# Patient Record
Sex: MALE | Race: ASIAN | NOT HISPANIC OR LATINO | Employment: OTHER | URBAN - METROPOLITAN AREA
[De-identification: names, ages, dates, MRNs, and addresses within clinical notes are randomized per-mention and may not be internally consistent; named-entity substitution may affect disease eponyms.]

---

## 2022-03-26 ENCOUNTER — HOSPITAL ENCOUNTER (OUTPATIENT)
Dept: RADIOLOGY | Facility: MEDICAL CENTER | Age: 77
End: 2022-03-26
Payer: MEDICARE

## 2022-03-26 ENCOUNTER — HOSPITAL ENCOUNTER (INPATIENT)
Facility: MEDICAL CENTER | Age: 77
LOS: 2 days | DRG: 282 | End: 2022-03-28
Attending: INTERNAL MEDICINE | Admitting: STUDENT IN AN ORGANIZED HEALTH CARE EDUCATION/TRAINING PROGRAM
Payer: MEDICARE

## 2022-03-26 DIAGNOSIS — J43.1 PANLOBULAR EMPHYSEMA (HCC): ICD-10-CM

## 2022-03-26 DIAGNOSIS — I48.91 ATRIAL FIBRILLATION WITH SLOW VENTRICULAR RESPONSE (HCC): ICD-10-CM

## 2022-03-26 PROBLEM — I21.4 NSTEMI (NON-ST ELEVATED MYOCARDIAL INFARCTION) (HCC): Status: ACTIVE | Noted: 2022-03-26

## 2022-03-26 PROCEDURE — 700111 HCHG RX REV CODE 636 W/ 250 OVERRIDE (IP): Performed by: STUDENT IN AN ORGANIZED HEALTH CARE EDUCATION/TRAINING PROGRAM

## 2022-03-26 PROCEDURE — 770020 HCHG ROOM/CARE - TELE (206)

## 2022-03-26 PROCEDURE — 99223 1ST HOSP IP/OBS HIGH 75: CPT | Mod: AI | Performed by: STUDENT IN AN ORGANIZED HEALTH CARE EDUCATION/TRAINING PROGRAM

## 2022-03-26 PROCEDURE — 93005 ELECTROCARDIOGRAM TRACING: CPT | Performed by: STUDENT IN AN ORGANIZED HEALTH CARE EDUCATION/TRAINING PROGRAM

## 2022-03-26 RX ORDER — METOPROLOL SUCCINATE 25 MG/1
25 TABLET, EXTENDED RELEASE ORAL DAILY
Status: DISCONTINUED | OUTPATIENT
Start: 2022-03-27 | End: 2022-03-26

## 2022-03-26 RX ORDER — AMLODIPINE BESYLATE 10 MG/1
10 TABLET ORAL DAILY
Status: DISCONTINUED | OUTPATIENT
Start: 2022-03-27 | End: 2022-03-28 | Stop reason: HOSPADM

## 2022-03-26 RX ORDER — LEVOTHYROXINE SODIUM 0.07 MG/1
75 TABLET ORAL
Status: DISCONTINUED | OUTPATIENT
Start: 2022-03-27 | End: 2022-03-28 | Stop reason: HOSPADM

## 2022-03-26 RX ORDER — CLOPIDOGREL BISULFATE 75 MG/1
75 TABLET ORAL DAILY
Status: DISCONTINUED | OUTPATIENT
Start: 2022-03-27 | End: 2022-03-27

## 2022-03-26 RX ORDER — LISINOPRIL 10 MG/1
10 TABLET ORAL DAILY
Status: DISCONTINUED | OUTPATIENT
Start: 2022-03-27 | End: 2022-03-28 | Stop reason: HOSPADM

## 2022-03-26 RX ORDER — ATORVASTATIN CALCIUM 40 MG/1
40 TABLET, FILM COATED ORAL DAILY
Status: DISCONTINUED | OUTPATIENT
Start: 2022-03-27 | End: 2022-03-28 | Stop reason: HOSPADM

## 2022-03-26 RX ORDER — HEPARIN SODIUM 5000 [USP'U]/ML
5000 INJECTION, SOLUTION INTRAVENOUS; SUBCUTANEOUS EVERY 8 HOURS
Status: DISCONTINUED | OUTPATIENT
Start: 2022-03-26 | End: 2022-03-27

## 2022-03-26 RX ADMIN — HEPARIN SODIUM 5000 UNITS: 5000 INJECTION, SOLUTION INTRAVENOUS; SUBCUTANEOUS at 22:43

## 2022-03-26 ASSESSMENT — COGNITIVE AND FUNCTIONAL STATUS - GENERAL
DAILY ACTIVITIY SCORE: 24
SUGGESTED CMS G CODE MODIFIER DAILY ACTIVITY: CH
MOBILITY SCORE: 24
SUGGESTED CMS G CODE MODIFIER MOBILITY: CH

## 2022-03-26 ASSESSMENT — LIFESTYLE VARIABLES
HOW MANY TIMES IN THE PAST YEAR HAVE YOU HAD 5 OR MORE DRINKS IN A DAY: 0
ON A TYPICAL DAY WHEN YOU DRINK ALCOHOL HOW MANY DRINKS DO YOU HAVE: 0
CONSUMPTION TOTAL: NEGATIVE
HAVE YOU EVER FELT YOU SHOULD CUT DOWN ON YOUR DRINKING: NO
DOES PATIENT WANT TO STOP DRINKING: NO
TOTAL SCORE: 0
EVER HAD A DRINK FIRST THING IN THE MORNING TO STEADY YOUR NERVES TO GET RID OF A HANGOVER: NO
EVER FELT BAD OR GUILTY ABOUT YOUR DRINKING: NO
AVERAGE NUMBER OF DAYS PER WEEK YOU HAVE A DRINK CONTAINING ALCOHOL: 0
TOTAL SCORE: 0
HAVE PEOPLE ANNOYED YOU BY CRITICIZING YOUR DRINKING: NO
ALCOHOL_USE: NO
TOTAL SCORE: 0

## 2022-03-26 ASSESSMENT — ENCOUNTER SYMPTOMS
EYES NEGATIVE: 1
GASTROINTESTINAL NEGATIVE: 1
PSYCHIATRIC NEGATIVE: 1
NEUROLOGICAL NEGATIVE: 1
CARDIOVASCULAR NEGATIVE: 1
MUSCULOSKELETAL NEGATIVE: 1
RESPIRATORY NEGATIVE: 1

## 2022-03-26 ASSESSMENT — PATIENT HEALTH QUESTIONNAIRE - PHQ9
SUM OF ALL RESPONSES TO PHQ9 QUESTIONS 1 AND 2: 0
2. FEELING DOWN, DEPRESSED, IRRITABLE, OR HOPELESS: NOT AT ALL
1. LITTLE INTEREST OR PLEASURE IN DOING THINGS: NOT AT ALL

## 2022-03-26 ASSESSMENT — FIBROSIS 4 INDEX
FIB4 SCORE: 2.61
FIB4 SCORE: 2.61

## 2022-03-27 ENCOUNTER — APPOINTMENT (OUTPATIENT)
Dept: CARDIOLOGY | Facility: MEDICAL CENTER | Age: 77
DRG: 282 | End: 2022-03-27
Attending: INTERNAL MEDICINE
Payer: MEDICARE

## 2022-03-27 LAB
ALBUMIN SERPL BCP-MCNC: 4 G/DL (ref 3.2–4.9)
ALBUMIN/GLOB SERPL: 1.6 G/DL
ALP SERPL-CCNC: 66 U/L (ref 30–99)
ALT SERPL-CCNC: 13 U/L (ref 2–50)
ANION GAP SERPL CALC-SCNC: 10 MMOL/L (ref 7–16)
AST SERPL-CCNC: 19 U/L (ref 12–45)
BILIRUB SERPL-MCNC: 0.5 MG/DL (ref 0.1–1.5)
BUN SERPL-MCNC: 23 MG/DL (ref 8–22)
CALCIUM SERPL-MCNC: 9.1 MG/DL (ref 8.5–10.5)
CHLORIDE SERPL-SCNC: 99 MMOL/L (ref 96–112)
CO2 SERPL-SCNC: 26 MMOL/L (ref 20–33)
CREAT SERPL-MCNC: 1.65 MG/DL (ref 0.5–1.4)
EKG IMPRESSION: NORMAL
ERYTHROCYTE [DISTWIDTH] IN BLOOD BY AUTOMATED COUNT: 49.6 FL (ref 35.9–50)
GFR SERPLBLD CREATININE-BSD FMLA CKD-EPI: 42 ML/MIN/1.73 M 2
GLOBULIN SER CALC-MCNC: 2.5 G/DL (ref 1.9–3.5)
GLUCOSE SERPL-MCNC: 138 MG/DL (ref 65–99)
HCT VFR BLD AUTO: 42 % (ref 42–52)
HGB BLD-MCNC: 14.2 G/DL (ref 14–18)
LV EJECT FRACT  99904: 65
LV EJECT FRACT MOD 2C 99903: 72.97
LV EJECT FRACT MOD 4C 99902: 65.57
LV EJECT FRACT MOD BP 99901: 69.31
MAGNESIUM SERPL-MCNC: 2.2 MG/DL (ref 1.5–2.5)
MCH RBC QN AUTO: 33.4 PG (ref 27–33)
MCHC RBC AUTO-ENTMCNC: 33.8 G/DL (ref 33.7–35.3)
MCV RBC AUTO: 98.8 FL (ref 81.4–97.8)
PLATELET # BLD AUTO: 238 K/UL (ref 164–446)
PMV BLD AUTO: 10.4 FL (ref 9–12.9)
POTASSIUM SERPL-SCNC: 4.4 MMOL/L (ref 3.6–5.5)
PROT SERPL-MCNC: 6.5 G/DL (ref 6–8.2)
RBC # BLD AUTO: 4.25 M/UL (ref 4.7–6.1)
SODIUM SERPL-SCNC: 135 MMOL/L (ref 135–145)
TROPONIN T SERPL-MCNC: 34 NG/L (ref 6–19)
TSH SERPL DL<=0.005 MIU/L-ACNC: 2.07 UIU/ML (ref 0.38–5.33)
WBC # BLD AUTO: 8.6 K/UL (ref 4.8–10.8)

## 2022-03-27 PROCEDURE — 770020 HCHG ROOM/CARE - TELE (206)

## 2022-03-27 PROCEDURE — 93306 TTE W/DOPPLER COMPLETE: CPT

## 2022-03-27 PROCEDURE — 93010 ELECTROCARDIOGRAM REPORT: CPT | Performed by: INTERNAL MEDICINE

## 2022-03-27 PROCEDURE — 99221 1ST HOSP IP/OBS SF/LOW 40: CPT | Performed by: INTERNAL MEDICINE

## 2022-03-27 PROCEDURE — 84443 ASSAY THYROID STIM HORMONE: CPT

## 2022-03-27 PROCEDURE — 700102 HCHG RX REV CODE 250 W/ 637 OVERRIDE(OP): Performed by: STUDENT IN AN ORGANIZED HEALTH CARE EDUCATION/TRAINING PROGRAM

## 2022-03-27 PROCEDURE — 85027 COMPLETE CBC AUTOMATED: CPT

## 2022-03-27 PROCEDURE — 84484 ASSAY OF TROPONIN QUANT: CPT

## 2022-03-27 PROCEDURE — 700111 HCHG RX REV CODE 636 W/ 250 OVERRIDE (IP): Performed by: STUDENT IN AN ORGANIZED HEALTH CARE EDUCATION/TRAINING PROGRAM

## 2022-03-27 PROCEDURE — A9270 NON-COVERED ITEM OR SERVICE: HCPCS | Performed by: STUDENT IN AN ORGANIZED HEALTH CARE EDUCATION/TRAINING PROGRAM

## 2022-03-27 PROCEDURE — 36415 COLL VENOUS BLD VENIPUNCTURE: CPT

## 2022-03-27 PROCEDURE — 700102 HCHG RX REV CODE 250 W/ 637 OVERRIDE(OP): Performed by: INTERNAL MEDICINE

## 2022-03-27 PROCEDURE — A9270 NON-COVERED ITEM OR SERVICE: HCPCS | Performed by: INTERNAL MEDICINE

## 2022-03-27 PROCEDURE — 80053 COMPREHEN METABOLIC PANEL: CPT

## 2022-03-27 PROCEDURE — 83735 ASSAY OF MAGNESIUM: CPT

## 2022-03-27 RX ADMIN — LISINOPRIL 10 MG: 10 TABLET ORAL at 05:20

## 2022-03-27 RX ADMIN — CLOPIDOGREL BISULFATE 75 MG: 75 TABLET ORAL at 05:20

## 2022-03-27 RX ADMIN — HEPARIN SODIUM 5000 UNITS: 5000 INJECTION, SOLUTION INTRAVENOUS; SUBCUTANEOUS at 05:20

## 2022-03-27 RX ADMIN — APIXABAN 2.5 MG: 2.5 TABLET, FILM COATED ORAL at 10:05

## 2022-03-27 RX ADMIN — ATORVASTATIN CALCIUM 40 MG: 40 TABLET, FILM COATED ORAL at 05:20

## 2022-03-27 RX ADMIN — AMLODIPINE BESYLATE 10 MG: 10 TABLET ORAL at 05:20

## 2022-03-27 RX ADMIN — APIXABAN 2.5 MG: 2.5 TABLET, FILM COATED ORAL at 16:14

## 2022-03-27 RX ADMIN — LEVOTHYROXINE SODIUM 75 MCG: 0.07 TABLET ORAL at 05:20

## 2022-03-27 ASSESSMENT — ENCOUNTER SYMPTOMS
PALPITATIONS: 0
BRUISES/BLEEDS EASILY: 0
MYALGIAS: 0
NAUSEA: 0
WHEEZING: 0
NERVOUS/ANXIOUS: 0
EYE DISCHARGE: 0
FATIGUE: 1
FEVER: 0
VOMITING: 0
COUGH: 0
EYE PAIN: 0
ABDOMINAL PAIN: 0
CHILLS: 0

## 2022-03-27 ASSESSMENT — PAIN DESCRIPTION - PAIN TYPE: TYPE: ACUTE PAIN

## 2022-03-27 ASSESSMENT — FIBROSIS 4 INDEX: FIB4 SCORE: 1.73

## 2022-03-27 NOTE — FACE TO FACE
Face to Face Note  -  Durable Medical Equipment    Chaparro De Los Santos M.D. - NPI: [id number]  I certify that this patient is under my care and that they had a durable medical equipment(DME)face to face encounter by myself that meets the physician DME face-to-face encounter requirements with this patient on:    Date of encounter:   Patient:                    MRN:                       YOB: 2024  Donald Lopez  [id number]  1945     The encounter with the patient was in whole, or in part, for the following medical condition, which is the primary reason for durable medical equipment:  COPD    I certify that, based on my findings, the following durable medical equipment is medically necessary:  Oxygen.    HOME O2 Saturation Measurements:(Values must be present for Home Oxygen orders)  Room air sat at rest: 90  Room air sat with amb: 84  With liters of O2: 2, O2 sat at rest with O2: 93  With Liters of O2: 4, O2 sat with amb with O2 : 94  Is the patient mobile?: Yes    My Clinical findings support the need for the above equipment due to:  Hypoxia    Supporting Symptoms: hypoxia    If patient feels more short of breath, they can go up to 6 liters per minute and contact healthcare provider.

## 2022-03-27 NOTE — DISCHARGE PLANNING
Received Choice form at 1122  Agency/Facility Name: [facility]   Referral sent per Choice form @ 1128

## 2022-03-27 NOTE — DISCHARGE PLANNING
Agency/Facility Name: [facility]   Spoke To: Neal   Outcome: DPA received all regarding patients DME Order. Requested if [facility] needs to service patient for drive home before creating new set up for patients home.   DPA requested information from RN CM via Voalte  And VM left for follow up/    RN CM notified     Agency/Facility Name: [facility]  Spoke To: Neal   Outcome: DPA received a call from DME Coordinator confirming patients DME Order and concern of getting patients concentrator and tanks from [facility].  DPA confirmed with RN CM  That voicemail was  not received with the understanding that [employer] extension for  RN CM was not correct.   DPA forwarded  call down to RN CM to confirm tank quantity and timing of DME delivery.    RN CM notified

## 2022-03-27 NOTE — ASSESSMENT & PLAN NOTE
Noted to have slow ventricular response with bradycardia as low as 40s at outside facility  Currently heart rate in normal range and in sinus rhythm, confirmed by EKG  Telemetry monitoring  Cardiology consulted, follow official note

## 2022-03-27 NOTE — DISCHARGE PLANNING
Anticipated Discharge Disposition: Home with home oxygen    Action: contacted the patient over the phone to discuss DME company choices. The patient is not aware of any local companies and is ok to sending referrals to national companies.    Choice form faxed to [facility] to [phone].    Barriers to Discharge: Oxygen delivery    Plan: Will need to follow up on oxygen

## 2022-03-27 NOTE — PROGRESS NOTES
Patient stated that their home oxygen was delivered to their home in [place]. No O2 tanks have yet been delivered bedside.  notified.

## 2022-03-27 NOTE — ASSESSMENT & PLAN NOTE
Admit patient to telemetry  Troponin elevated, loaded with ASA at outside facility, trend troponin   Continue aspirin Plavix  TTE  Cardiology consulted, follow official note

## 2022-03-27 NOTE — CARE PLAN
The patient is Stable - Low risk of patient condition declining or worsening    Shift Goals  Clinical Goals: monitor HR and BP, cards consult in AM  Patient Goals: sleep, food  Family Goals: comfort    Progress made toward(s) clinical / shift goals:  Yes    Problem: Knowledge Deficit - Standard  Goal: Patient and family/care givers will demonstrate understanding of plan of care, disease process/condition, diagnostic tests and medications  Outcome: Progressing       Patient is not progressing towards the following goals:

## 2022-03-27 NOTE — DISCHARGE SUMMARY
Discharge Summary    CHIEF COMPLAINT ON ADMISSION  No chief complaint on file.      Reason for Admission  Bradycardia     Admission Date  3/26/2022    CODE STATUS  Full Code    HPI & HOSPITAL COURSE  Donald Lopez is a 76 y.o. male who presented 3/26/2022 with Generalized weakness and malaise for several days.  Patient states that his oxygen has been below 90% on room air as he has been checking it at home.  Does not use home oxygen.     Patient has a history of atrial fibrillation, hypothyroidism, hypertension, extensive smoking history.  He smoked about 50-pack-years, however has not smoked in the last 7 years.  Has never been evaluated for COPD.  Patient has never had an MI before.  States that he had a stress test last month, and was told it was negative.     He presented to outside facility and was found to have atrial fibrillation with slow ventricular response.  He was found to have 3 troponins are slightly elevated.  Throughout ER course, patient was found to have bradycardia as low as 46.  Cardiology was consulted and agreed for transfer for possible NSTEMI and possible pacemaker placement    Cardiology saw him this morning and think that he is having paroxysmal atrial fibrillation with some component of probable sick sinus syndrome.  And they do not recommend pacemaker at this point but recommended that he may require in the future and to follow-up with cardiology from [place].  In addition to recommend to discontinue Plavix and started patient on Eliquis.  We will perform home oxygen evaluation for him.  In the meantime due to his heart rate in his 50-60 range holding metoprolol at this point.  He was instructed to come back to ER if his symptoms get worse.  I saw and examined the patient today.  Please call [phone] to schedule PCP appointment for patient.    Required specialty appointments include:     As below  Therefore, he is discharged in fair and stable condition to home with close outpatient  follow-up.    The patient met 2-midnight criteria for an inpatient stay at the time of discharge.    Discharge Date  03/27/22  \    FOLLOW UP ITEMS POST DISCHARGE      DISCHARGE DIAGNOSES  Principal Problem:    NSTEMI (non-ST elevated myocardial infarction) (HCC) POA: Yes  Active Problems:    Hypothyroid POA: Unknown    CAD (coronary artery disease) POA: Unknown    Hypertension POA: Unknown    Panlobular emphysema (HCC) POA: Unknown    Atrial fibrillation with slow ventricular response (HCC) POA: Unknown  Resolved Problems:    * No resolved hospital problems. *      FOLLOW UP  No future appointments.  PCP     In 1 week      cardiology at [facility]    In 1 week        MEDICATIONS ON DISCHARGE     Medication List      START taking these medications      Instructions   apixaban 5mg Tabs  Commonly known as: ELIQUIS   Take 1 Tablet by mouth 2 times a day. Indications: Thromboembolism secondary to Atrial Fibrillation  Dose: 5 mg        CONTINUE taking these medications      Instructions   amLODIPine 5 MG Tabs  Commonly known as: NORVASC   Take 10 mg by mouth every day.  Dose: 10 mg     atorvastatin 20 MG Tabs  Commonly known as: LIPITOR   Take 40 mg by mouth every day.  Dose: 40 mg     famotidine 20 MG Tabs  Commonly known as: PEPCID   Take 20 mg by mouth 2 times a day.  Dose: 20 mg     levothyroxine 75 MCG Tabs  Commonly known as: SYNTHROID   Take 75 mcg by mouth.  Dose: 75 mcg     lisinopril 10 MG Tabs  Commonly known as: PRINIVIL   Take 10 mg by mouth every day.  Dose: 10 mg        STOP taking these medications    clopidogrel 75 MG Tabs  Commonly known as: PLAVIX     metoprolol SR 50 MG Tb24  Commonly known as: TOPROL XL            Allergies  No Known Allergies    DIET  Orders Placed This Encounter   Procedures   • Diet Order Diet: Cardiac     Standing Status:   Standing     Number of Occurrences:   1     Order Specific Question:   Diet:     Answer:   Cardiac [6]       ACTIVITY  As tolerated.  Weight bearing as  tolerated    CONSULTATIONS  card    PROCEDURES      LABORATORY  Lab Results   Component Value Date    SODIUM 132 (L) 03/26/2022    POTASSIUM 4.3 03/26/2022    CHLORIDE 99 03/26/2022    CO2 27 03/26/2022    GLUCOSE 122 (H) 03/26/2022    BUN 26 (H) 03/26/2022    CREATININE 1.5 (H) 03/26/2022        Lab Results   Component Value Date    WBC 12.6 (H) 03/26/2022    HEMOGLOBIN 14.6 03/26/2022    HEMATOCRIT 43.8 03/26/2022    PLATELETCT 274 03/26/2022        Total time of the discharge process exceeds 38 minutes.

## 2022-03-27 NOTE — PROGRESS NOTES
4 Eyes Skin Assessment Completed by REGINADL Enriquez and Ashley RN.    Head WDL  Ears WDL  Nose WDL  Mouth WDL  Neck WDL  Breast/Chest WDL  Shoulder Blades WDL  Spine WDL  (R) Arm/Elbow/Hand WDL  (L) Arm/Elbow/Hand WDL  Abdomen WDL  Groin Scar  Scrotum/Coccyx/Buttocks WDL  (R) Leg Scar and Scab  (L) Leg Scar and Scab  (R) Heel/Foot/Toe WDL  (L) Heel/Foot/Toe WDL          Devices In Places Tele Box, Pulse Ox and Nasal Cannula      Interventions In Place NC W/Ear Foams    Possible Skin Injury No    Pictures Uploaded Into Epic N/A  Wound Consult Placed N/A  RN Wound Prevention Protocol Ordered No

## 2022-03-27 NOTE — RESPIRATORY CARE
COPD EDUCATION by COPD CLINICAL EDUCATOR  3/27/2022 at 6:05 AM by Yesy Valladares, RRT     Patient reviewed by COPD education team. Patient does not have a history or diagnosis of COPD and is a non-smoker.  Therefore, patient does not qualify for the COPD program.

## 2022-03-27 NOTE — PROGRESS NOTES
Patient arrived via direct transfer EMS, report received. Patient placed on tele monitor, monitor room notified. Pt VSS, family present. EKG performed, SR. Physical assessment performed. Patient is A&O X 4, pain level 0.  Patient oriented to room and unit routine. Patient educated on plan for the day. Personal belongings within reach and educated on use of call light.

## 2022-03-27 NOTE — PROGRESS NOTES
Assumed care of patient, received bedside report from NOC RN. Patient is A&O X 4. Pain 0/10. Vital signs stable overnight, on 2L NC satting 94%. On tele monitor, SR 62. POC discussed with patient and wife and they verbalized understanding. Call light within reach and fall precautions in place. Bed locked and in lowest position.

## 2022-03-27 NOTE — CONSULTS
Cardiology Initial Consultation    Date of Service  3/27/2022    Referring Physician  Avery Moura M.D.    Reason for Consultation  Paroxysmal atrial fibrillation    History of Presenting Illness  Donald Lopez is a 76 y.o. male with a past medical history of known paroxysmal atrial fibrillation who presented 3/26/2022 with atrial fibrillation at [facility].  In A. fib with slow ventricular response/felt weak no syncope.  [place] in [facility].  Patient went back into sinus rhythm last night and felt back to baseline.  History of some component of aortic stenosis, peripheral vascular disease status post iliac stents and femoral endarterectomies.  History of MGUS.  Followed at [facility].  Recent diagnosis of atrial fibrillation approximately a week ago.  Was placed on Eliquis but has not started.  Has been on Plavix.  Patient went back in a sinus rhythm and feels back to baseline.  Denies smoking at this time previously smoked up to 7 years ago.  No alcohol use.  .  Has children.  Retired businessman.  Lives in [place].  CT scan without pulmonary embolus.  Minimal troponin rise.  No chest pain.    Review of Systems  Review of Systems   Constitutional: Positive for fatigue. Negative for chills and fever.   HENT: Negative for congestion.    Eyes: Negative for pain and discharge.   Respiratory: Negative for cough and wheezing.    Cardiovascular: Negative for chest pain and palpitations.   Gastrointestinal: Negative for abdominal pain, nausea and vomiting.   Musculoskeletal: Negative for myalgias.   Skin: Negative for rash.   Hematological: Does not bruise/bleed easily.   Psychiatric/Behavioral: The patient is not nervous/anxious.    All other systems reviewed and are negative.      Past Medical History   has a past medical history of CAD (coronary artery disease), Hypertension, and Hypothyroid.    Surgical History   has a past surgical history that  includes other cardiac surgery.    Family History  family history is not on file.    Social History   reports that he has quit smoking. He has never used smokeless tobacco. He reports previous alcohol use. He reports that he does not use drugs.    Medications  Prior to Admission Medications   Prescriptions Last Dose Informant Patient Reported? Taking?   amLODIPine (NORVASC) 5 MG Tab 3/25/2022 at 0600  Yes No   Sig: Take 10 mg by mouth every day.   atorvastatin (LIPITOR) 20 MG Tab 3/25/2022 at 0600  Yes No   Sig: Take 40 mg by mouth every day.   clopidogrel (PLAVIX) 75 MG Tab 3/25/2022 at 0600  Yes No   Sig: Take 75 mg by mouth every day.   famotidine (PEPCID) 20 MG Tab 3/22/2022  Yes No   Sig: Take 20 mg by mouth 2 times a day.   levothyroxine (SYNTHROID) 75 MCG Tab 3/25/2022 at 0600  Yes No   Sig: Take 75 mcg by mouth.   lisinopril (PRINIVIL) 10 MG Tab 3/25/2022 at 1800  Yes No   Sig: Take 10 mg by mouth every day.   metoprolol SR (TOPROL XL) 50 MG TABLET SR 24 HR 3/25/2022 at 1800  Yes No   Sig: Take 25 mg by mouth every day.      Facility-Administered Medications: None       Allergies  No Known Allergies    Vital signs in last 24 hours  Temp:  [36.9 °C (98.4 °F)-37.2 °C (99 °F)] 37.2 °C (99 °F)  Pulse:  [56-63] 59  Resp:  [18] 18  BP: (150-174)/(45-60) 156/56  SpO2:  [94 %-97 %] 96 %    Physical Exam  Physical Exam  Vitals reviewed.   Constitutional:       General: He is not in acute distress.     Appearance: He is well-developed. He is not diaphoretic.   Eyes:      Conjunctiva/sclera: Conjunctivae normal.   Neck:      Thyroid: No thyroid mass or thyromegaly.      Vascular: No JVD.      Trachea: No tracheal deviation.   Cardiovascular:      Rate and Rhythm: Normal rate and regular rhythm.      Heart sounds: Murmur heard.   Pulmonary:      Effort: Pulmonary effort is normal. No respiratory distress.      Breath sounds: Normal breath sounds.   Chest:      Chest wall: No tenderness.   Abdominal:      Palpations:  Abdomen is soft.      Tenderness: There is no abdominal tenderness.   Musculoskeletal:         General: Normal range of motion.   Skin:     General: Skin is warm and dry.   Neurological:      Mental Status: He is alert and oriented to person, place, and time.      Motor: No tremor.   Psychiatric:         Behavior: Behavior normal.         Lab Review  Lab Results   Component Value Date/Time    WBC 12.6 (H) 03/26/2022 04:35 AM    RBC 4.37 (L) 03/26/2022 04:35 AM    HEMOGLOBIN 14.6 03/26/2022 04:35 AM    HEMATOCRIT 43.8 03/26/2022 04:35 AM    .2 (H) 03/26/2022 04:35 AM    MCH 33.4 (H) 03/26/2022 04:35 AM    MCHC 33.3 03/26/2022 04:35 AM    MPV 10.2 03/26/2022 04:35 AM      Lab Results   Component Value Date/Time    SODIUM 132 (L) 03/26/2022 04:35 AM    POTASSIUM 4.3 03/26/2022 04:35 AM    CHLORIDE 99 03/26/2022 04:35 AM    CO2 27 03/26/2022 04:35 AM    GLUCOSE 122 (H) 03/26/2022 04:35 AM    BUN 26 (H) 03/26/2022 04:35 AM    CREATININE 1.5 (H) 03/26/2022 04:35 AM      Lab Results   Component Value Date/Time    ASTSGOT 45 03/26/2022 04:35 AM    ALTSGPT 24 03/26/2022 04:35 AM     Lab Results   Component Value Date/Time    TROPONINT 34 (H) 03/27/2022 03:23 AM       No results for input(s): NTPROBNP in the last 72 hours.    Cardiac Imaging and Procedures Review  EKG:  My personal interpretation of the EKG dated 3/26/2022 is atrial fibrillation with slow ventricular response.  LVH.  3 EKGs.. Electrocardiogram here from 3/26/2022.  Sinus rhythm at 62 bpm      Echocardiogram: Pending      Imaging  Chest X-Ray: Cardiomegaly        Assessment/Plan  No new Assessment & Plan notes have been filed under this hospital service since the last note was generated.  Service: Cardiac Electrophysiology  1.  Paroxysmal atrial fibrillation with some component of probable sick sinus syndrome.  No clear indication for pacing as of yet.  May require pacing in the future.  Can follow-up with [facility] for further evaluation.  2.   Anticoagulation.  DC Plavix started NOAC.  3.  Peripheral vascular disease stable.  4.  MGUS.  5.  Aortic stenosis of some component.  6.  Hypertension.  7.  Very likely can be discharged today with close follow-up at [place].  Thank you for allowing me to participate in the care of this patient.      Please contact me with any questions.    Hadley Kyle M.D.   Cardiologist, [facility]  [phone]

## 2022-03-27 NOTE — ASSESSMENT & PLAN NOTE
Seen on CTPA  Has about 50-pack-year smoking history, last cigarette 7 years ago.  Never been checked for COPD  Will likely need pulmonary consult for evaluation of COPD  Oxygen as needed

## 2022-03-27 NOTE — H&P
Hospital Medicine History & Physical Note    Date of Service  3/26/2022    Primary Care Physician  Pcp Not In Computer    Consultants  cardiology    Specialist Names: Dr. Kyle    Code Status  Full Code    Chief Complaint  Generalized weakness    History of Presenting Illness  Donald Lopez is a 76 y.o. male who presented 3/26/2022 with Generalized weakness and malaise for several days.  Patient states that his oxygen has been below 90% on room air as he has been checking it at home.  Does not use home oxygen.    Patient has a history of atrial fibrillation, hypothyroidism, hypertension, extensive smoking history.  He smoked about 50-pack-years, however has not smoked in the last 7 years.  Has never been evaluated for COPD.  Patient has never had an MI before.  States that he had a stress test last month, and was told it was negative.    He presented to outside facility and was found to have atrial fibrillation with slow ventricular response.  He was found to have 3 troponins are slightly elevated.  Throughout ER course, patient was found to have bradycardia as low as 46.  Cardiology was consulted and agreed for transfer for possible NSTEMI and possible pacemaker placement     patient seen at bedside. Has no complaints at this time.     I discussed the plan of care with patient.    Review of Systems  Review of Systems   Constitutional: Positive for malaise/fatigue.   HENT: Negative.    Eyes: Negative.    Respiratory: Negative.    Cardiovascular: Negative.    Gastrointestinal: Negative.    Genitourinary: Negative.    Musculoskeletal: Negative.    Skin: Negative.    Neurological: Negative.    Endo/Heme/Allergies: Negative.    Psychiatric/Behavioral: Negative.        Past Medical History   has a past medical history of CAD (coronary artery disease), Hypertension, and Hypothyroid.    Surgical History   has a past surgical history that includes other cardiac surgery.     Family History   Family history reviewed with  patient. There is no family history that is pertinent to the chief complaint.     Social History   reports that he has quit smoking. He has never used smokeless tobacco. He reports previous alcohol use. He reports that he does not use drugs.    Allergies  No Known Allergies    Medications  Prior to Admission Medications   Prescriptions Last Dose Informant Patient Reported? Taking?   amLODIPine (NORVASC) 5 MG Tab 3/25/2022 at 0600  Yes No   Sig: Take 10 mg by mouth every day.   atorvastatin (LIPITOR) 20 MG Tab 3/25/2022 at 0600  Yes No   Sig: Take 40 mg by mouth every day.   clopidogrel (PLAVIX) 75 MG Tab 3/25/2022 at 0600  Yes No   Sig: Take 75 mg by mouth every day.   famotidine (PEPCID) 20 MG Tab 3/22/2022  Yes No   Sig: Take 20 mg by mouth 2 times a day.   levothyroxine (SYNTHROID) 75 MCG Tab 3/25/2022 at 0600  Yes No   Sig: Take 75 mcg by mouth.   lisinopril (PRINIVIL) 10 MG Tab 3/25/2022 at 1800  Yes No   Sig: Take 10 mg by mouth every day.   metoprolol SR (TOPROL XL) 50 MG TABLET SR 24 HR 3/25/2022 at 1800  Yes No   Sig: Take 25 mg by mouth every day.      Facility-Administered Medications: None       Physical Exam  Temp:  [36 °C (96.8 °F)-37 °C (98.6 °F)] 37 °C (98.6 °F)  Pulse:  [46-74] 63  Resp:  [15-44] 18  BP: (128-197)/(60-79) 174/60  SpO2:  [78 %-99 %] 97 %  Blood Pressure : (!) 174/60   Temperature: 37 °C (98.6 °F)   Pulse: 63   Respiration: 18   Pulse Oximetry: 97 %       Physical Exam  Constitutional:       Appearance: Normal appearance. He is normal weight.   HENT:      Head: Normocephalic.      Nose: Nose normal.      Mouth/Throat:      Mouth: Mucous membranes are moist.   Eyes:      Pupils: Pupils are equal, round, and reactive to light.   Cardiovascular:      Rate and Rhythm: Normal rate and regular rhythm.      Pulses: Normal pulses.   Pulmonary:      Effort: Pulmonary effort is normal.      Breath sounds: Normal breath sounds.   Abdominal:      General: Abdomen is flat. Bowel sounds are normal.       Palpations: Abdomen is soft.   Musculoskeletal:         General: Normal range of motion.      Cervical back: Normal range of motion.   Skin:     General: Skin is warm.   Neurological:      General: No focal deficit present.      Mental Status: He is alert and oriented to person, place, and time. Mental status is at baseline.   Psychiatric:         Mood and Affect: Mood normal.         Behavior: Behavior normal.         Thought Content: Thought content normal.         Judgment: Judgment normal.         Laboratory:  Recent Labs     03/26/22 0435   WBC 12.6*   RBC 4.37*   HEMOGLOBIN 14.6   HEMATOCRIT 43.8   .2*   MCH 33.4*   MCHC 33.3   RDW 13.4   PLATELETCT 274   MPV 10.2     Recent Labs     03/26/22 0435   SODIUM 132*   POTASSIUM 4.3   CHLORIDE 99   CO2 27   GLUCOSE 122*   BUN 26*   CREATININE 1.5*   CALCIUM 8.9     Recent Labs     03/26/22 0435   ALTSGPT 24   ASTSGOT 45   ALKPHOSPHAT 68   TBILIRUBIN 0.5   LIPASE 119   GLUCOSE 122*         No results for input(s): NTPROBNP in the last 72 hours.      No results for input(s): TROPONINT in the last 72 hours.    Imaging:  No orders to display       EKG:  I have personally reviewed the images and compared with prior images.    Assessment/Plan:  I anticipate this patient will require at least two midnights for appropriate medical management, necessitating inpatient admission.    * NSTEMI (non-ST elevated myocardial infarction) (HCC)- (present on admission)  Assessment & Plan  Admit patient to telemetry  Troponin elevated, loaded with ASA at outside facility, trend troponin   Continue aspirin Plavix  TTE  Cardiology consulted, follow official note      Atrial fibrillation with slow ventricular response (HCC)  Assessment & Plan  Noted to have slow ventricular response with bradycardia as low as 40s at outside facility  Currently heart rate in normal range and in sinus rhythm, confirmed by EKG  Telemetry monitoring  Cardiology consulted, follow official  note    Panlobular emphysema (HCC)  Assessment & Plan  Seen on CTPA  Has about 50-pack-year smoking history, last cigarette 7 years ago.  Never been checked for COPD  Will likely need pulmonary consult for evaluation of COPD  Oxygen as needed    Hypertension  Assessment & Plan  Continue Norvasc    CAD (coronary artery disease)  Assessment & Plan  Continue lisinopril    Hypothyroid  Assessment & Plan  Continue Synthroid      VTE prophylaxis: heparin ppx

## 2022-03-28 ENCOUNTER — PHARMACY VISIT (OUTPATIENT)
Dept: PHARMACY | Facility: MEDICAL CENTER | Age: 77
End: 2022-03-28
Payer: COMMERCIAL

## 2022-03-28 VITALS
HEIGHT: 63 IN | OXYGEN SATURATION: 95 % | DIASTOLIC BLOOD PRESSURE: 49 MMHG | TEMPERATURE: 97.7 F | BODY MASS INDEX: 21.8 KG/M2 | WEIGHT: 123.02 LBS | RESPIRATION RATE: 16 BRPM | SYSTOLIC BLOOD PRESSURE: 115 MMHG | HEART RATE: 68 BPM

## 2022-03-28 PROCEDURE — 700102 HCHG RX REV CODE 250 W/ 637 OVERRIDE(OP): Performed by: INTERNAL MEDICINE

## 2022-03-28 PROCEDURE — A9270 NON-COVERED ITEM OR SERVICE: HCPCS | Performed by: STUDENT IN AN ORGANIZED HEALTH CARE EDUCATION/TRAINING PROGRAM

## 2022-03-28 PROCEDURE — 700102 HCHG RX REV CODE 250 W/ 637 OVERRIDE(OP): Performed by: STUDENT IN AN ORGANIZED HEALTH CARE EDUCATION/TRAINING PROGRAM

## 2022-03-28 PROCEDURE — RXMED WILLOW AMBULATORY MEDICATION CHARGE: Performed by: INTERNAL MEDICINE

## 2022-03-28 PROCEDURE — 99239 HOSP IP/OBS DSCHRG MGMT >30: CPT | Performed by: INTERNAL MEDICINE

## 2022-03-28 PROCEDURE — A9270 NON-COVERED ITEM OR SERVICE: HCPCS | Performed by: INTERNAL MEDICINE

## 2022-03-28 RX ADMIN — ATORVASTATIN CALCIUM 40 MG: 40 TABLET, FILM COATED ORAL at 04:35

## 2022-03-28 RX ADMIN — AMLODIPINE BESYLATE 10 MG: 10 TABLET ORAL at 04:34

## 2022-03-28 RX ADMIN — LISINOPRIL 10 MG: 10 TABLET ORAL at 04:34

## 2022-03-28 RX ADMIN — LEVOTHYROXINE SODIUM 75 MCG: 0.07 TABLET ORAL at 04:35

## 2022-03-28 RX ADMIN — APIXABAN 2.5 MG: 2.5 TABLET, FILM COATED ORAL at 04:35

## 2022-03-28 NOTE — DISCHARGE PLANNING
Spoke with Neal at [facility]. Neal stated that they have talked to the patient and delivered a concentrator to his home in [place]. Neal also arranged portable tanks to be delivered by a local [facility] to the bedside. ETA by 6 pm today.  Deanne Huitron's number is [phone]  [place] office number is [phone]

## 2022-03-28 NOTE — PROGRESS NOTES
Assumed care of patient, received bedside report from NOC RN. Patient is A&O X 4. Pain 0/10. Vital signs stable overnight, on 2L NC, satting 95%. On tele monitor, SR 71. Dr. De Los Santos contacted this AM about discharging medication. He will come see the patient bedside. POC discussed with patient and wife they verbalized understanding. Call light within reach and fall precautions in place. Bed locked and in lowest position.

## 2022-03-28 NOTE — PROGRESS NOTES
Monitor Summary:     Rhythm: Sinus Rhythm/Jose Francisco    Rate: 55-67    Measurement: .24/.09/.47    Ectopy: none    12 hr cc

## 2022-03-28 NOTE — CARE PLAN
The patient is Stable - Low risk of patient condition declining or worsening    Shift Goals  Clinical Goals: Discharge in AM  Patient Goals: Discharge in AM  Family Goals: Discharge in AM    Progress made toward(s) clinical / shift goals:  Pt will be discharged in the AM once medications have been switched by the provider.

## 2022-03-28 NOTE — PROGRESS NOTES
Patient discharged home with wife. All belongings with patient. IV and tele box removed. Discharge instructions gone over and medications picked up from pharmacy. Talked with scheduling about appointments, no follow up was scheduled here as the patient lives in [place], [place]. Patient has a follow-up in [place] with his PCP and an appointment with his Cardiologist at [facility].

## 2022-03-28 NOTE — PROGRESS NOTES
Bedside report received from day RN, pt care assumed, assessment completed. Pt is A&O 4, no current pain, pt on the monitor. Updated on POC, questions answered. Bed in lowest, locked position, treaded socks on, call light and belongings within reach. Fall precautions in place.

## 2022-03-28 NOTE — PROGRESS NOTES
Patient's home O2 was delivered bedside at 1730. Patient will be discharging in the AM due to patient not being able to afford the eliquis that Dr. De Los Santos prescribed. Dr. De Los Santos was notified about the price of the prescription and also notified that the patient cannot afford it in [place] as well. Dr. De Los Santos stated that if the oxygen were to be delivered late to keep him overnight and we would discharge in the morning as well as switch the discharging medications in the morning. Patient and wife notified and said they would stay the one more night if patient could leave early in the AM. Charge RN and  notified.

## 2022-03-28 NOTE — DISCHARGE INSTRUCTIONS
Discharge Instructions    Discharged to home by car with relative. Discharged via wheelchair, hospital escort: Yes.  Special equipment needed: Oxygen    Be sure to schedule a follow-up appointment with your primary care doctor or any specialists as instructed.     Discharge Plan:   Diet Plan: Discussed  Activity Level: Discussed  Confirmed Follow up Appointment: Appointment Scheduled  Confirmed Symptoms Management: Discussed  Medication Reconciliation Updated: Yes  Influenza Vaccine Indication: Not indicated: Previously immunized this influenza season and > 8 years of age    I understand that a diet low in cholesterol, fat, and sodium is recommended for good health. Unless I have been given specific instructions below for another diet, I accept this instruction as my diet prescription.   Other diet: Cardiac    Special Instructions: Diagnosis:  Acute Coronary Syndrome (ACS) is a diagnosis that encompasses cardiac-related chest pain and heart attack. ACS occurs when the blood flow to the heart muscle is severely reduced or cut off completely due to a slow process called atherosclerosis.  Atherosclerosis is a disease in which the coronary arteries become narrow from a buildup of fat, cholesterol, and other substances that combine to form plaque. If the plaque breaks, a blood clot will form and block the blood flow to the heart muscle. This lack of blood flow can cause damage or death to the heart muscle which is called a heart attack or Myocardial Infarction (MI). There are two different types of MIs:  ST Elevation Myocardial Infarction or STEMI (the most severe type of heart attack) and Non-ST Elevation Myocardial Infarction or NSTEMI.    Treatment Plan:  · Cardiac Diet  - Low fat, low salt, low cholesterol   · Cardiac Rehab  - Your doctor has ordered you a referral to [facility].  Call [phone] to schedule an appointment.  · Attend my follow-up appointment with my Cardiologist.  · Take my medications as prescribed  by my doctor  · Exercise daily  · Quit Smoking    Medications:  Certain medications are used to treat ACS.  Remember to always take medications as prescribed and never stop talking medications unless told by your doctor.    You have been prescribed the following medicatons:    Anti-platelet/blood thinner - Your Anti-platelet/Blood thinning medication is called Eliquis, and is used in combination with aspirin to prevent clots from forming in your heart and/or around your stent.  Your doctor will determine how long you need to be on this medicine.  Statin - Statin atorvastatin is used to lower cholesterol.  Angiotensin Converting Enzyme (ACE) Inhibitor - Angiotensin Converting Enzyme Inhibitor lisinopril is used to lower blood pressure and treat heart failure.    · Is patient discharged on Warfarin / Coumadin?   No     Depression / Suicide Risk    As you are discharged from this [facility] facility, it is important to learn how to keep safe from harming yourself.    Recognize the warning signs:  · Abrupt changes in personality, positive or negative- including increase in energy   · Giving away possessions  · Change in eating patterns- significant weight changes-  positive or negative  · Change in sleeping patterns- unable to sleep or sleeping all the time   · Unwillingness or inability to communicate  · Depression  · Unusual sadness, discouragement and loneliness  · Talk of wanting to die  · Neglect of personal appearance   · Rebelliousness- reckless behavior  · Withdrawal from people/activities they love  · Confusion- inability to concentrate     If you or a loved one observes any of these behaviors or has concerns about self-harm, here's what you can do:  · Talk about it- your feelings and reasons for harming yourself  · Remove any means that you might use to hurt yourself (examples: pills, rope, extension cords, firearm)  · Get professional help from the community (Mental Health, Substance Abuse, psychological  counseling)  · Do not be alone:Call your Safe Contact- someone whom you trust who will be there for you.  · Call your local CRISIS HOTLINE 387-1870 or 950-707-2193  · Call your local Children's Mobile Crisis Response Team [place] (798) 223-9114 or www.[employer]  · Call the toll free National Suicide Prevention Hotlines   · National Suicide Prevention Lifeline 661-995-AYDT (9820)  · National Hope Line Network 800-SUICIDE (310-3240)      Apixaban oral tablets  What is this medicine?  APIXABAN (a PIX a ban) is an anticoagulant (blood thinner). It is used to lower the chance of stroke in people with a medical condition called atrial fibrillation. It is also used to treat or prevent blood clots in the lungs or in the veins.  This medicine may be used for other purposes; ask your health care provider or pharmacist if you have questions.  COMMON BRAND NAME(S): Eliquis  What should I tell my health care provider before I take this medicine?  They need to know if you have any of these conditions:  · antiphospholipid antibody syndrome  · bleeding disorders  · bleeding in the brain  · blood in your stools (black or tarry stools) or if you have blood in your vomit  · history of blood clots  · history of stomach bleeding  · kidney disease  · liver disease  · mechanical heart valve  · an unusual or allergic reaction to apixaban, other medicines, foods, dyes, or preservatives  · pregnant or trying to get pregnant  · breast-feeding  How should I use this medicine?  Take this medicine by mouth with a glass of water. Follow the directions on the prescription label. You can take it with or without food. If it upsets your stomach, take it with food. Take your medicine at regular intervals. Do not take it more often than directed. Do not stop taking except on your doctor's advice. Stopping this medicine may increase your risk of a blood clot. Be sure to refill your prescription before you run out of medicine.  Talk to your  pediatrician regarding the use of this medicine in children. Special care may be needed.  Overdosage: If you think you have taken too much of this medicine contact a poison control center or emergency room at once.  NOTE: This medicine is only for you. Do not share this medicine with others.  What if I miss a dose?  If you miss a dose, take it as soon as you can. If it is almost time for your next dose, take only that dose. Do not take double or extra doses.  What may interact with this medicine?  This medicine may interact with the following:  · aspirin and aspirin-like medicines  · certain medicines for fungal infections like ketoconazole and itraconazole  · certain medicines for seizures like carbamazepine and phenytoin  · certain medicines that treat or prevent blood clots like warfarin, enoxaparin, and dalteparin  · clarithromycin  · NSAIDs, medicines for pain and inflammation, like ibuprofen or naproxen  · rifampin  · ritonavir  · Derik's wort  This list may not describe all possible interactions. Give your health care provider a list of all the medicines, herbs, non-prescription drugs, or dietary supplements you use. Also tell them if you smoke, drink alcohol, or use illegal drugs. Some items may interact with your medicine.  What should I watch for while using this medicine?  Visit your healthcare professional for regular checks on your progress. You may need blood work done while you are taking this medicine. Your condition will be monitored carefully while you are receiving this medicine. It is important not to miss any appointments.  Avoid sports and activities that might cause injury while you are using this medicine. Severe falls or injuries can cause unseen bleeding. Be careful when using sharp tools or knives. Consider using an electric razor. Take special care brushing or flossing your teeth. Report any injuries, bruising, or red spots on the skin to your healthcare professional.  If you are going  to need surgery or other procedure, tell your healthcare professional that you are taking this medicine.  Wear a medical ID bracelet or chain. Carry a card that describes your disease and details of your medicine and dosage times.  What side effects may I notice from receiving this medicine?  Side effects that you should report to your doctor or health care professional as soon as possible:  · allergic reactions like skin rash, itching or hives, swelling of the face, lips, or tongue  · signs and symptoms of bleeding such as bloody or black, tarry stools; red or dark-brown urine; spitting up blood or brown material that looks like coffee grounds; red spots on the skin; unusual bruising or bleeding from the eye, gums, or nose  · signs and symptoms of a blood clot such as chest pain; shortness of breath; pain, swelling, or warmth in the leg  · signs and symptoms of a stroke such as changes in vision; confusion; trouble speaking or understanding; severe headaches; sudden numbness or weakness of the face, arm or leg; trouble walking; dizziness; loss of coordination  This list may not describe all possible side effects. Call your doctor for medical advice about side effects. You may report side effects to FDA at 2-647-FDA-2258.  Where should I keep my medicine?  Keep out of the reach of children.  Store at room temperature between 20 and 25 degrees C (68 and 77 degrees F). Throw away any unused medicine after the expiration date.  NOTE: This sheet is a summary. It may not cover all possible information. If you have questions about this medicine, talk to your doctor, pharmacist, or health care provider.  © 2020 [employer] (2019-08-28 17:39:34)

## 2022-04-05 PROCEDURE — 93306 TTE W/DOPPLER COMPLETE: CPT | Mod: 26 | Performed by: INTERNAL MEDICINE

## 2022-05-11 LAB
LV EJECT FRACT  99904: 65
LV EJECT FRACT MOD 2C 99903: 72.97
LV EJECT FRACT MOD 4C 99902: 65.57
LV EJECT FRACT MOD BP 99901: 69.31

## 2023-11-01 ENCOUNTER — OFFICE VISIT (OUTPATIENT)
Facility: LOCATION | Age: 78
End: 2023-11-01
Payer: MEDICARE

## 2023-11-01 DIAGNOSIS — H25.13 AGE-RELATED NUCLEAR CATARACT, BILATERAL: Primary | ICD-10-CM

## 2023-11-01 DIAGNOSIS — H16.223 BILATERAL KERATOCONJUNCTIVITIS SICCA, NOT SPECIFIED AS SJOGREN'S: ICD-10-CM

## 2023-11-01 PROCEDURE — 99214 OFFICE O/P EST MOD 30 MIN: CPT | Performed by: OPHTHALMOLOGY

## 2023-11-01 ASSESSMENT — INTRAOCULAR PRESSURE
OD: 11
OS: 13

## 2024-12-12 ENCOUNTER — OFFICE VISIT (OUTPATIENT)
Facility: LOCATION | Age: 79
End: 2024-12-12
Payer: MEDICARE

## 2024-12-12 DIAGNOSIS — H25.13 AGE-RELATED NUCLEAR CATARACT, BILATERAL: Primary | ICD-10-CM

## 2024-12-12 DIAGNOSIS — H16.223 BILATERAL KERATOCONJUNCTIVITIS SICCA, NOT SPECIFIED AS SJOGREN'S: ICD-10-CM

## 2024-12-12 DIAGNOSIS — H02.831 DERMATOCHALASIS OF RIGHT UPPER EYELID: ICD-10-CM

## 2024-12-12 DIAGNOSIS — H02.834 DERMATOCHALASIS OF LEFT UPPER EYELID: ICD-10-CM

## 2024-12-12 DIAGNOSIS — H34.211 HOLLENHORST'S PLAQUE OF RIGHT EYE: ICD-10-CM

## 2024-12-12 PROCEDURE — 92250 FUNDUS PHOTOGRAPHY W/I&R: CPT | Performed by: OPHTHALMOLOGY

## 2024-12-12 PROCEDURE — 99214 OFFICE O/P EST MOD 30 MIN: CPT | Performed by: OPHTHALMOLOGY

## 2024-12-12 ASSESSMENT — INTRAOCULAR PRESSURE
OS: 10
OD: 14